# Patient Record
Sex: MALE | Race: WHITE | NOT HISPANIC OR LATINO | URBAN - METROPOLITAN AREA
[De-identification: names, ages, dates, MRNs, and addresses within clinical notes are randomized per-mention and may not be internally consistent; named-entity substitution may affect disease eponyms.]

---

## 2017-04-20 ENCOUNTER — OUTPATIENT (OUTPATIENT)
Dept: OUTPATIENT SERVICES | Facility: HOSPITAL | Age: 57
LOS: 1 days | Discharge: HOME | End: 2017-04-20

## 2017-04-20 ENCOUNTER — APPOINTMENT (OUTPATIENT)
Dept: PODIATRY | Facility: CLINIC | Age: 57
End: 2017-04-20

## 2017-04-20 VITALS
HEIGHT: 71 IN | DIASTOLIC BLOOD PRESSURE: 66 MMHG | SYSTOLIC BLOOD PRESSURE: 120 MMHG | HEART RATE: 70 BPM | WEIGHT: 170 LBS | BODY MASS INDEX: 23.8 KG/M2

## 2017-04-20 DIAGNOSIS — S92.912A UNSPECIFIED FRACTURE OF LEFT TOE(S), INITIAL ENCOUNTER FOR CLOSED FRACTURE: ICD-10-CM

## 2017-04-20 DIAGNOSIS — M79.672 PAIN IN LEFT FOOT: ICD-10-CM

## 2017-04-20 PROBLEM — Z00.00 ENCOUNTER FOR PREVENTIVE HEALTH EXAMINATION: Status: ACTIVE | Noted: 2017-04-20

## 2017-05-18 ENCOUNTER — APPOINTMENT (OUTPATIENT)
Dept: PODIATRY | Facility: CLINIC | Age: 57
End: 2017-05-18

## 2017-06-27 DIAGNOSIS — M79.672 PAIN IN LEFT FOOT: ICD-10-CM

## 2017-06-27 DIAGNOSIS — S92.912A UNSPECIFIED FRACTURE OF LEFT TOE(S), INITIAL ENCOUNTER FOR CLOSED FRACTURE: ICD-10-CM

## 2019-01-01 ENCOUNTER — EMERGENCY (EMERGENCY)
Facility: HOSPITAL | Age: 59
LOS: 0 days | Discharge: HOME | End: 2019-01-02
Attending: EMERGENCY MEDICINE | Admitting: EMERGENCY MEDICINE

## 2019-01-01 VITALS
RESPIRATION RATE: 20 BRPM | TEMPERATURE: 98 F | OXYGEN SATURATION: 96 % | SYSTOLIC BLOOD PRESSURE: 139 MMHG | WEIGHT: 169.98 LBS | DIASTOLIC BLOOD PRESSURE: 82 MMHG | HEART RATE: 88 BPM | HEIGHT: 72 IN

## 2019-01-01 DIAGNOSIS — S09.90XA UNSPECIFIED INJURY OF HEAD, INITIAL ENCOUNTER: ICD-10-CM
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
## 2019-01-01 DIAGNOSIS — Z23 ENCOUNTER FOR IMMUNIZATION: ICD-10-CM
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
## 2019-01-01 DIAGNOSIS — R22.0 LOCALIZED SWELLING, MASS AND LUMP, HEAD: ICD-10-CM
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
## 2019-01-01 DIAGNOSIS — S01.81XA LACERATION WITHOUT FOREIGN BODY OF OTHER PART OF HEAD, INITIAL ENCOUNTER: ICD-10-CM

## 2019-01-02 VITALS
SYSTOLIC BLOOD PRESSURE: 138 MMHG | RESPIRATION RATE: 20 BRPM | OXYGEN SATURATION: 97 % | HEART RATE: 86 BPM | DIASTOLIC BLOOD PRESSURE: 78 MMHG

## 2019-01-02 RX ORDER — TETANUS TOXOID, REDUCED DIPHTHERIA TOXOID AND ACELLULAR PERTUSSIS VACCINE, ADSORBED 5; 2.5; 8; 8; 2.5 [IU]/.5ML; [IU]/.5ML; UG/.5ML; UG/.5ML; UG/.5ML
0.5 SUSPENSION INTRAMUSCULAR ONCE
Qty: 0 | Refills: 0 | Status: COMPLETED | OUTPATIENT
Start: 2019-01-02 | End: 2019-01-02

## 2019-01-02 RX ADMIN — TETANUS TOXOID, REDUCED DIPHTHERIA TOXOID AND ACELLULAR PERTUSSIS VACCINE, ADSORBED 0.5 MILLILITER(S): 5; 2.5; 8; 8; 2.5 SUSPENSION INTRAMUSCULAR at 01:12

## 2019-01-02 NOTE — ED PROVIDER NOTE - CHPI ED SYMPTOMS NEG
no syncope/no weakness/no confusion/no change in level of consciousness/no nausea/no blurred vision/no dizziness/no vomiting

## 2019-01-02 NOTE — ED PROVIDER NOTE - PROGRESS NOTE DETAILS
Results d/w patient and family and copies of results provided.  Pt instructed to return if any worsening symptoms or concerns.  They verbalize understanding. Results d/w patient and family and copies of results provided.  Pt instructed to return if any worsening symptoms or concerns.  They verbalize understanding. patient ambulatory in the ER talking on phone multiple times
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
## 2019-01-02 NOTE — ED PROVIDER NOTE - PHYSICAL EXAMINATION
Physical Exam    Vital Signs: I have reviewed the initial vital signs.  Constitutional: well-nourished, appears stated age, no acute distress  Eyes: Conjunctiva pink, Sclera clear, PERRLA, EOMI.  Ears: TMs clear  Nose - no septal hematoma  Cardiovascular: S1 and S2, regular rate, regular rhythm, well-perfused extremities, radial pulses equal and 2+  Respiratory: unlabored respiratory effort, clear to auscultation bilaterally no wheezing, rales and rhonchi  Gastrointestinal: soft, non-tender abdomen, no pulsatile mass, normal bowl sounds  Musculoskeletal: supple neck, no lower extremity edema, no midline tenderness  Integumentary: warm, dry, no rash + left facial irregular laceration that is Y shaped. + left posterior scalp swelling and tenderness.   Neurologic: awake, alert, cranial nerves II-XII grossly intact, extremities’ motor and sensory functions grossly intact  Psychiatric: appropriate mood, appropriate affect
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
## 2019-01-02 NOTE — ED PROVIDER NOTE - NS ED ROS FT
Constitutional: (-) fever  Eyes/ENT: (-) blurry vision, (-) epistaxis  Cardiovascular: (-) chest pain, (-) syncope  Respiratory: (-) cough, (-) shortness of breath  Gastrointestinal: (-) vomiting, (-) diarrhea  Musculoskeletal: (-) neck pain, (-) back pain, (-) joint pain  Integumentary: (+ lac  Neurological: (+) headache, (-) altered mental status  Psychiatric: (-) hallucinations  Allergic/Immunologic: (-) pruritus

## 2019-01-02 NOTE — ED PROVIDER NOTE - NSFOLLOWUPINSTRUCTIONS_ED_ALL_ED_FT
Follow up with your primary care doctor in 1-2 days and follow up with primary doctor or here in emergency room for suture removal in 5 days

## 2019-01-10 ENCOUNTER — EMERGENCY (EMERGENCY)
Facility: HOSPITAL | Age: 59
LOS: 0 days | Discharge: HOME | End: 2019-01-10
Attending: EMERGENCY MEDICINE | Admitting: EMERGENCY MEDICINE

## 2019-01-10 VITALS
TEMPERATURE: 98 F | OXYGEN SATURATION: 100 % | HEIGHT: 72 IN | DIASTOLIC BLOOD PRESSURE: 76 MMHG | SYSTOLIC BLOOD PRESSURE: 128 MMHG | HEART RATE: 76 BPM | WEIGHT: 169.98 LBS | RESPIRATION RATE: 18 BRPM
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
## 2019-01-10 DIAGNOSIS — S01.80XD UNSPECIFIED OPEN WOUND OF OTHER PART OF HEAD, SUBSEQUENT ENCOUNTER: ICD-10-CM

## 2019-01-10 NOTE — ED ADULT NURSE NOTE - NSIMPLEMENTINTERV_GEN_ALL_ED
Implemented All Universal Safety Interventions:  [place] to call system. Call bell, personal items and telephone within reach. Instruct patient to call for assistance. Room bathroom lighting operational. Non-slip footwear when patient is off stretcher. Physically safe environment: no spills, clutter or unnecessary equipment. Stretcher in lowest position, wheels locked, appropriate side rails in place.

## 2019-01-10 NOTE — ED PROVIDER NOTE - NS ED ROS FT
Review of Systems    Constitutional: (-) fever or chills  respiratory: (-) cough (-) shortness of breath  Cardiovascular: (-) syncope, palpitations or chest pain  Integumentary: (+) wound repair   Neurological: (-) altered mental status, headache or head injury

## 2019-01-10 NOTE — ED PROVIDER NOTE - ATTENDING CONTRIBUTION TO CARE
59 yo M presents for suture removal.  Sutures placed 8 days ago in ED.  On exam pt in NAD AAO x 3, + sutures intact, healing well to left lateral eye, no sign sof infection

## 2019-01-10 NOTE — ED PROVIDER NOTE - OBJECTIVE STATEMENT
57 y/o male presents for suture removal . wound to left forehead with repair x 1 week ago. no increase in pain, discharge, redness or headache

## 2019-01-10 NOTE — ED PROVIDER NOTE - PHYSICAL EXAMINATION
skin: left forehead +wound with sutures with surrounding crusting, no surrounding erythema or tenderness

## 2019-06-21 ENCOUNTER — EMERGENCY (EMERGENCY)
Facility: HOSPITAL | Age: 59
LOS: 0 days | Discharge: HOME | End: 2019-06-21
Attending: EMERGENCY MEDICINE | Admitting: EMERGENCY MEDICINE
Payer: MEDICAID

## 2019-06-21 VITALS
SYSTOLIC BLOOD PRESSURE: 144 MMHG | WEIGHT: 175.05 LBS | HEIGHT: 72 IN | DIASTOLIC BLOOD PRESSURE: 83 MMHG | OXYGEN SATURATION: 98 % | TEMPERATURE: 98 F | RESPIRATION RATE: 19 BRPM | HEART RATE: 85 BPM

## 2019-06-21 DIAGNOSIS — H53.8 OTHER VISUAL DISTURBANCES: ICD-10-CM

## 2019-06-21 DIAGNOSIS — H53.10 UNSPECIFIED SUBJECTIVE VISUAL DISTURBANCES: ICD-10-CM

## 2019-06-21 PROCEDURE — 99282 EMERGENCY DEPT VISIT SF MDM: CPT

## 2019-06-21 NOTE — ED PROVIDER NOTE - PROVIDER TOKENS
FREE:[LAST:[Garg],FIRST:[Cher],PHONE:[phone]],FAX:[(   )    -],ADDRESS:[[address] [place] [address]  Phone: [phone]]]

## 2019-06-21 NOTE — ED PROVIDER NOTE - OBJECTIVE STATEMENT
60 y/o M who presents for evaluation of it being harder to see.  Patient had trouble seeing a long time ago and bought his own prescription glasses without an exam. When he wears these glasses, he sees perfectly fine. He has a harder time seeing without the glasses.  This has been going on for weeks. No fever, eye pain, headache, numbness, weakness, tingling. As per patient "I wear magnifying glasses and for one week I've noticed my vision is blurry". Denies further complaints

## 2019-06-21 NOTE — ED ADULT TRIAGE NOTE - CHIEF COMPLAINT QUOTE
As per patient "I wear magnifying glasses and for one week I've noticed my vision is blurry". Denies further complaints

## 2019-06-21 NOTE — ED PROVIDER NOTE - NSFOLLOWUPINSTRUCTIONS_ED_ALL_ED_FT
Your vision was equal in both eyes and normal (20/30) with glasses.  You had no headache or other symptoms.  Your uncorrected vision is not normal in both eyes. It is recommended to go for a formal eye exam and also to see a regular medical doctor for an annual check up.      Please visit the website [employer] with your insurance card and choose an Internal Medicine doctor and also an eye doctor (Ophthalmologist).          Eye strain facts  The term eye strain describes a group of symptoms which occur in some people after extended use of the eyes.  Although eye strain can be uncomfortable, it does not lead to any eye damage.  Extended computer use or inadequate or excessive lighting may cause eye strain, but there are no permanent consequences of this.  Symptoms of eye strain can include  headaches,  blurring of the vision,  feelings of dryness, and  other discomfort, but eye strain will not damage your eyes or change their anatomy.    What is eye strain?  The term eye strain is frequently used by people to describe a group of vague symptoms that are related to use of the eyes. Eye strain is a symptom, not an eye disease. Eye strain occurs when your eyes get tired from intense use, such as driving a car for extended periods, reading, or working at the computer. If you have any eye discomfort caused by looking at something for a long time, you can call it eye strain.    Although eye strain can be annoying, it usually is not serious and goes away once you rest your eyes. In some cases, signs and symptoms of eye strain are a sign of an underlying eye condition that needs treatment. Although you may not be able to change the nature of your job or all the factors that can cause eye strain, you can take steps to reduce eye strain.      What causes eye strain?   Readers Comments 2  Share Your Story  The medical term for eye strain is asthenopia. The symptoms of ocular fatigue, tired eyes, blurring, headaches, and occasionally doubling of the vision are brought on by concentrated use of the eyes for visual tasks. Some people, while concentrating on a visually intense task such as reading fine print, using the computer for hours at a time, or trying to see in the dark, unconsciously clench the muscles of their eyelids, face, temples, and jaws and develop discomfort or pain from overuse of those muscles. This may lead to a vicious cycle of tensing those muscles further and causing more distress. Other people attempting to do similar visual tasks may have no symptoms at all.    Common precipitating factors for the onset of eye strain include extended use of a computer or video monitor, straining to see in very dim light, and exposure to extreme brightness or glare. Many people will blink less than normal when performing extended visual tasks. This decreased blinking may lead to dryness of the ocular surface and symptoms of dry eyes.    Refractive errors (a need for glasses for distance or near vision, or both) may produce the symptoms of eye strain.    The inability to make both eyes work together in a binocular fashion may also generate the symptoms of eye strain. However, most individuals who have limited or no binocular vision have no such symptoms.    In people who already have headaches or blurring of vision due to eye strain, symptoms may be worsened by an underlying eye problem such as an eye muscle imbalance or a need for glasses for the correction of myopia, hyperopia, or astigmatism. Wearing glasses with an incorrect prescription may cause eye strain. In those who already have eye strain, not getting enough sleep, certain medications, being under stress, or being fatigued can also make those symptoms worse.    Eye Strain Symptoms: Eye Pain  The eye is the organ of sight. Eye pain can be cause by conditions involving the eyeball (orbit) or be caused by conditions of structures around the eye.    The eye has a number of components. These components include but are not limited to the:    cornea,  iris,  pupil,  lens,  retina,  macula,  optic nerve,  choroid and  vitreous.  Read more about eye pain symptoms »    What are the symptoms of eye strain?   Readers Comments 1  Share Your Story  If one asks patients who complain of eye strain to define what they mean by that term, they may describe nonspecific soreness of the eyes, mild tearing or dryness, blurring of vision, soreness of the back of the neck, doubling of vision, light sensitivity, difficulty focusing on images, tightness of the temples, forehead, brow area, or back of the head, or combinations of all of these. Headache is the most common symptom. It is usually mild, located in both temples, not pounding, and often relieved by stopping the visual task.      What are the signs of eye strain?  The diagnosis of eye strain is made by a physician on the basis of the history as described by the patient and the absence of any serious eye disease. There are no specific tests to prove that the symptoms are indeed due to eye strain. There are no methods to objectively measure the degree of eye strain.    You should see an ophthalmologist if you have ongoing pain in the eye, visual loss, redness, or irritation of the eyes. These symptoms cannot be explained by eye strain. In general, if your eye strain is not relieved by resting your eyes, an eye examination should be performed. If you are examined by your ophthalmologist for your symptoms of eye strain, he or she will do an examination, including checking to see if you need glasses. If the doctor feels glasses may make your eyes more comfortable, these will be prescribed.    Symptoms of eye strain are unusual in children under 12 years of age. If your child complains of headaches after reading, eye fatigue, blurring of vision, or double vision, a visit to an ophthalmologist is warranted to rule out any underlying eye condition.

## 2019-06-21 NOTE — ED PROVIDER NOTE - NS ED ROS FT
Constitutional:  No fever, chills, lethargy, or abnormal weight loss  Eyes:  blurry vision without glasses.  ENMT: No nasal discharge, no toothache, no sore throat. No neck pain or stiffness  Cardiac:  No chest pain or palpitations  Respiratory:  No cough or respiratory distress.   GI:  No nausea, vomiting, diarrhea or abdominal pain.  :  No dysuria, frequency or burning.  MS:  No back or joint pain.  Neuro:  No headache. No numbness, weakness, or tingling.   Skin:  No skin rash  Except as documented in the HPI,  all other systems are negative

## 2019-06-21 NOTE — ED PROVIDER NOTE - PHYSICAL EXAMINATION
VITAL SIGNS: I have reviewed nursing notes and confirm.  CONSTITUTIONAL: well-appearing, non-toxic, NAD  SKIN: Warm dry, normal skin turgor  HEAD: NCAT  EYES: EOMI, PERRLA, no scleral icterus. Corrected vision 20/30 b/l.  Uncorrected vision 20/100 b/l  ENT: Moist mucous membranes, normal pharynx with no erythema or exudates  CARD: RRR, no murmurs, rubs or gallops  RESP: clear to ausculation b/l.  No rales, rhonchi, or wheezing.  ABD: soft, + BS, non-tender, non-distended, no rebound or guarding. No CVA tenderness  NEURO: normal motor. normal sensory. CN II-XII intact. Cerebellar testing normal. Normal gait.  PSYCH: Cooperative, appropriate.

## 2019-06-21 NOTE — ED PROVIDER NOTE - CARE PROVIDER_API CALL
Cher Garg  [address] [place] [address]  Phone: [phone]  Phone: [phone]  Fax: (   )    -  Follow Up Time:

## 2019-06-21 NOTE — ED PROVIDER NOTE - CLINICAL SUMMARY MEDICAL DECISION MAKING FREE TEXT BOX
Corrected vision is normal.  Normal neuro exam.  Otherwise normal eye exam. No signs of infectious etiology.  Will refer to Eye doctor for formal eye exam and possible appropriate correction lenses. Strict return precautions discussed.    Full DC instructions discussed and patient knows when to seek immediate medical attention.  Patient has proper follow up.  All results discussed and patient aware they may require further work up.  Proper follow up ensured. Limitations of ED work up discussed.  Medications administered and prescribed/OTC home meds discussed.  All questions and concerns from patient or family addressed. Understanding of instructions verbalized.

## 2020-07-06 NOTE — ED ADULT NURSE NOTE - NS ED NOTE  TALK SOMEONE YN
Date/Time Patient Seen:  		  Referring MD:   Data Reviewed	       Patient is a 87y old  Male who presents with a chief complaint of SBO (05 Jul 2020 21:35)      Subjective/HPI     PAST MEDICAL & SURGICAL HISTORY:  Pulmonary hypertension: moderate  COPD (chronic obstructive pulmonary disease): mild (no home oxygen)  Anxiety  CAD (coronary artery disease)  BPH (Benign Prostatic Hyperplasia)  Hyperlipemia  Hypertension  Detached retina, right: with repair  Bilateral cataracts: removed  H/O bilateral hip replacements        Medication list         MEDICATIONS  (STANDING):  amLODIPine   Tablet 10 milliGRAM(s) Oral daily  aspirin 325 milliGRAM(s) Oral daily  atorvastatin 10 milliGRAM(s) Oral at bedtime  budesonide  80 MICROgram(s)/formoterol 4.5 MICROgram(s) Inhaler 2 Puff(s) Inhalation two times a day  enoxaparin Injectable 40 milliGRAM(s) SubCutaneous daily  famotidine Injectable 20 milliGRAM(s) IV Push every 12 hours  finasteride 5 milliGRAM(s) Oral daily  guaiFENesin  milliGRAM(s) Oral every 12 hours  losartan 100 milliGRAM(s) Oral daily  metoprolol tartrate 25 milliGRAM(s) Oral two times a day  tamsulosin 0.4 milliGRAM(s) Oral at bedtime    MEDICATIONS  (PRN):  ALBUTerol    90 MICROgram(s) HFA Inhaler 2 Puff(s) Inhalation every 4 hours PRN Shortness of Breath and/or Wheezing  aluminum hydroxide/magnesium hydroxide/simethicone Suspension 30 milliLiter(s) Oral every 4 hours PRN Dyspepsia  ondansetron Injectable 4 milliGRAM(s) IV Push every 6 hours PRN Nausea         Vitals log        ICU Vital Signs Last 24 Hrs  T(C): 36.7 (06 Jul 2020 05:05), Max: 36.7 (06 Jul 2020 05:05)  T(F): 98.1 (06 Jul 2020 05:05), Max: 98.1 (06 Jul 2020 05:05)  HR: 76 (06 Jul 2020 05:05) (54 - 76)  BP: 164/81 (06 Jul 2020 05:05) (115/67 - 164/81)  BP(mean): --  ABP: --  ABP(mean): --  RR: 18 (06 Jul 2020 05:05) (18 - 18)  SpO2: 96% (06 Jul 2020 05:05) (93% - 96%)           Input and Output:  I&O's Detail      Lab Data                        14.6   9.88  )-----------( 227      ( 05 Jul 2020 06:44 )             41.9     07-05    138  |  104  |  12  ----------------------------<  112<H>  3.7   |  28  |  0.77    Ca    8.2<L>      05 Jul 2020 06:44  Mg     2.1     07-04              Review of Systems	      Objective     Physical Examination    heart s1s2  lung dec BS      Pertinent Lab findings & Imaging      [place]w:  NO   Adequate UO     I&O's Detail           Discussed with:     Cultures:	        Radiology No
